# Patient Record
Sex: FEMALE | Race: WHITE | NOT HISPANIC OR LATINO | URBAN - NONMETROPOLITAN AREA
[De-identification: names, ages, dates, MRNs, and addresses within clinical notes are randomized per-mention and may not be internally consistent; named-entity substitution may affect disease eponyms.]

---

## 2018-07-30 PROBLEM — E11.9: Noted: 2018-07-24

## 2018-07-30 PROBLEM — H52.03: Noted: 2018-07-30

## 2018-07-30 PROBLEM — H35.363: Noted: 2018-07-30

## 2018-07-30 PROBLEM — H25.043: Noted: 2018-07-30

## 2019-05-13 ENCOUNTER — IMPORTED ENCOUNTER (OUTPATIENT)
Dept: URBAN - NONMETROPOLITAN AREA CLINIC 1 | Facility: CLINIC | Age: 69
End: 2019-05-13

## 2019-05-13 PROCEDURE — 92134 CPTRZ OPH DX IMG PST SGM RTA: CPT

## 2019-05-13 PROCEDURE — 99214 OFFICE O/P EST MOD 30 MIN: CPT

## 2019-05-13 NOTE — PATIENT DISCUSSION
Cataract(s)-Visually significant.-Cataract(s) causing symptomatic impairment of visual function not correctable with a tolerable change in glasses or contact lenses lighting or non-operative means resulting in specific activity limitations and/or participation restrictions including but not limited to reading viewing television driving or meeting vocational or recreational needs. -Expectation is clearer vision and reduced glare disability after cataract removal.-Refer to Dr Maria Esther Lopez for cataract evaluation. NIDDM s  OU-Stressed the importance of keeping blood sugars under control blood pressure under control and weight normalization and regular visits with PCP. -Explained the possible effects of poorly controlled diabetes and the damage that diabetes can cause to ocular health. -Patient to check HgbA1C.-Pt instructed to contact our office with any vision changes. 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 Presbyopia OU-Discussed diagnosis with patient. Hyperopia OU-Discussed diagnosis with patient.

## 2019-06-05 ENCOUNTER — IMPORTED ENCOUNTER (OUTPATIENT)
Dept: URBAN - NONMETROPOLITAN AREA CLINIC 1 | Facility: CLINIC | Age: 69
End: 2019-06-05

## 2019-06-05 PROBLEM — H35.54: Noted: 2019-06-05

## 2019-06-05 PROBLEM — E11.9: Noted: 2019-06-05

## 2019-06-05 PROBLEM — H25.043: Noted: 2019-06-05

## 2019-06-05 PROCEDURE — 92235 FLUORESCEIN ANGRPH MLTIFRAME: CPT

## 2019-06-05 PROCEDURE — 92250 FUNDUS PHOTOGRAPHY W/I&R: CPT

## 2019-06-05 NOTE — PATIENT DISCUSSION
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 Cataract(s)- Proceed with catract evaluation with Dr. Yesenia MÁRQUEZ-Stressed the importance of keeping blood sugars under control blood pressure under control and weight normalization and regular visits with PCP. -Explained the possible effects of poorly controlled diabetes and the damage that diabetes can cause to ocular health. -Patient to check HgbA1C.-Pt instructed to contact our office with any vision changes.

## 2019-10-21 ENCOUNTER — IMPORTED ENCOUNTER (OUTPATIENT)
Dept: URBAN - NONMETROPOLITAN AREA CLINIC 1 | Facility: CLINIC | Age: 69
End: 2019-10-21

## 2019-10-21 PROBLEM — H35.3131: Noted: 2019-10-21

## 2019-10-21 PROBLEM — H25.813: Noted: 2019-10-21

## 2019-10-21 PROCEDURE — 92014 COMPRE OPH EXAM EST PT 1/>: CPT

## 2019-10-21 NOTE — PATIENT DISCUSSION
Cataract(s)-Visually significant cataract OU .-Cataract(s) causing symptomatic impairment of visual function not correctable with a tolerable change in glasses or contact lenses lighting or non-operative means resulting in specific activity limitations and/or participation restrictions including but not limited to reading viewing television driving or meeting vocational or recreational needs. -Expectation is clearer vision and functional improvement in symptoms as well as reduced glare disability after cataract removal.-Order IOLMaster and OPD today. -Recommend Stand IOL/Trad based on today's OPD testing and lifestyle questionnaire.-All questions were answered regarding surgery including pre and post-op medications appointments activity restrictions and anesthetic usage.-The risks benefits and alternatives and special risk factors for the patient were discussed in detail including but not limited to: bleeding infection retinal detachment vitreous loss problems with the implant and possible need for additional surgery.-Although rare the possibility of complete vision loss was discussed.-The possible need for glasses post-operatively was discussed.-Order medical clearance exam based on history of NIDDM -Patient elects to proceed with cataract surgery OD . Will schedule at patient's convenience and re-evaluate OS  in the future. Discussed all lens options with patient. Discussed the ARMD in detail with patient in detail and d/t this I do not recommend patient getting upgraded lens b/c I can not guarntee perfect vision after surgery. Recommend Standard IOL /Trad and discussed this with her. Discussed with patient she will see better without cataracts but she will then notice the distortion from the ARMD after surgery.  Discussed the Subfoveal Lesion Centrally OU and reviewed the scans that were taken previously with Dr. Pam Naik

## 2019-11-13 ENCOUNTER — IMPORTED ENCOUNTER (OUTPATIENT)
Dept: URBAN - NONMETROPOLITAN AREA CLINIC 1 | Facility: CLINIC | Age: 69
End: 2019-11-13

## 2019-11-13 PROBLEM — H35.3131: Noted: 2019-11-13

## 2019-11-13 PROBLEM — H25.813: Noted: 2019-11-13

## 2019-12-09 ENCOUNTER — IMPORTED ENCOUNTER (OUTPATIENT)
Dept: URBAN - NONMETROPOLITAN AREA CLINIC 1 | Facility: CLINIC | Age: 69
End: 2019-12-09

## 2019-12-09 PROBLEM — I10: Noted: 2019-12-09

## 2019-12-09 PROBLEM — Z01.818: Noted: 2019-12-09

## 2019-12-09 PROBLEM — E78.5: Noted: 2019-12-09

## 2019-12-09 PROBLEM — E11.9: Noted: 2019-12-09

## 2019-12-20 ENCOUNTER — IMPORTED ENCOUNTER (OUTPATIENT)
Dept: URBAN - NONMETROPOLITAN AREA CLINIC 1 | Facility: CLINIC | Age: 69
End: 2019-12-20

## 2019-12-20 PROBLEM — Z98.41: Noted: 2019-12-20

## 2019-12-20 PROBLEM — H25.812: Noted: 2019-12-20

## 2019-12-20 PROCEDURE — 99024 POSTOP FOLLOW-UP VISIT: CPT

## 2019-12-20 PROCEDURE — 92136 OPHTHALMIC BIOMETRY: CPT

## 2019-12-20 PROCEDURE — 66984 XCAPSL CTRC RMVL W/O ECP: CPT

## 2019-12-20 NOTE — PATIENT DISCUSSION
Cataract OS-Visually significant cataract OS. -Cataract causing symptomatic impairment of visual function not correctable with a tolerable change in glasses or contact lenses lighting or non-operative means resulting in specific activity limitations and/or participation restrictions including but not limited to reading viewing television driving or meeting vocational or recreational needs. -Expectation is clearer vision and functional improvement in symptoms as well as reduced glare disability after cataract removal.-Recommend standard/traditional based on previous OPD testing and lifestyle questionnaire.-All questions were answered regarding surgery including pre and post-op medications appointments activity restrictions and anesthetic usage.-The risks benefits and alternatives and special risk factors for the patient were discussed in detail including but not limited to: bleeding infection retinal detachment vitreous loss problems with the implant and possible need for additional surgery.-Although rare the possibility of complete vision loss was discussed.-The need for glasses post-operatively was discussed.-Patient elects to proceed with cataract surgery OS. Will schedule at patient's convenience. Same day s/p PCIOL OD-Pt doing well s/p PCIOL. -Continue post-op gtts according to instruction sheet and sleep with eye shield over eye for 7 nights.-Avoid bending at the waist lifting anything over 5lbs and dirty or marielle environments. -RTC .

## 2019-12-23 ENCOUNTER — IMPORTED ENCOUNTER (OUTPATIENT)
Dept: URBAN - NONMETROPOLITAN AREA CLINIC 1 | Facility: CLINIC | Age: 69
End: 2019-12-23

## 2019-12-23 PROBLEM — H35.54: Noted: 2019-12-23

## 2019-12-23 PROBLEM — E11.9: Noted: 2019-12-23

## 2019-12-23 PROBLEM — Z98.42: Noted: 2020-01-02

## 2019-12-23 PROBLEM — Z98.41: Noted: 2019-12-23

## 2019-12-23 PROBLEM — Z98.41: Noted: 2020-01-02

## 2019-12-23 PROBLEM — H25.812: Noted: 2019-12-23

## 2019-12-23 PROCEDURE — 99024 POSTOP FOLLOW-UP VISIT: CPT

## 2019-12-23 NOTE — PATIENT DISCUSSION
3 day s/p CE c IOL OD-Pt doing well s/p PCIOL. - Due to corneal edema recommend begin Dank 128 2 gtts QID OD.-Continue post-op gtts according to instruction sheet and sleep with eye shield over eye for 4 nights.-Avoid bending at the waist lifting anything over 5lbs and dirty or marielle environments.- Recommend follow-up as scheduled. Cataract OS- Recommend proceed with cataract surgery OS as scheduled. Vitelliform dystrophy OU - Previos FA shows OU Very difficult to view due to medial opacity; central foveal lesions consistent with vitelliform or RPE clumping.- No ARMD no treatment inidcated recommend observation.

## 2019-12-30 ENCOUNTER — IMPORTED ENCOUNTER (OUTPATIENT)
Dept: URBAN - NONMETROPOLITAN AREA CLINIC 1 | Facility: CLINIC | Age: 69
End: 2019-12-30

## 2019-12-30 PROCEDURE — 99024 POSTOP FOLLOW-UP VISIT: CPT

## 2019-12-30 NOTE — PATIENT DISCUSSION
s/p PCIOL CE OD Standard IOL /Trad 12/20/19-Pt doing well at 1 week s/p PCIOL. -Continue post-op gtts according to instruction sheet.-Okay to resume usual activites and d/c eye shield.-Edema resolved  compliant with Dank 128 QID. Cataract OS- Recommend proceed with cataract surgery OS as scheduled. -Recc using Dank 128 QID after surgery as well. Vitelliform dystrophy OU - Previos FA shows OU Very difficult to view due to medial opacity; central foveal lesions consistent with vitelliform or RPE clumping.- No ARMD no treatment inidcated recommend observation. ""

## 2020-01-02 ENCOUNTER — IMPORTED ENCOUNTER (OUTPATIENT)
Dept: URBAN - NONMETROPOLITAN AREA CLINIC 1 | Facility: CLINIC | Age: 70
End: 2020-01-02

## 2020-01-02 PROCEDURE — 92136 OPHTHALMIC BIOMETRY: CPT

## 2020-01-02 PROCEDURE — 99024 POSTOP FOLLOW-UP VISIT: CPT

## 2020-01-02 PROCEDURE — 66984 XCAPSL CTRC RMVL W/O ECP: CPT

## 2020-01-02 NOTE — PATIENT DISCUSSION
Same day s/p PCIOL OS-Pt doing well s/p PCIOL. -Continue post-op gtts according to instruction sheet and sleep with eye shield over eye for 7 nights.-Avoid bending at the waist lifting anything over 5lbs and dirty or marielle environments. 2 week s/p PCIOL OD-Pt doing well at 2 week s/p PCIOL. -Continue post-op gtts according to instruction sheet. Vitelliform dystrophy OU - Previos FA shows OU Very difficult to view due to medial opacity; central foveal lesions consistent with vitelliform or RPE clumping.- No ARMD no treatment inidcated recommend observation.

## 2020-01-09 ENCOUNTER — IMPORTED ENCOUNTER (OUTPATIENT)
Dept: URBAN - NONMETROPOLITAN AREA CLINIC 1 | Facility: CLINIC | Age: 70
End: 2020-01-09

## 2020-01-09 PROCEDURE — 99024 POSTOP FOLLOW-UP VISIT: CPT

## 2020-01-09 NOTE — PATIENT DISCUSSION
1 week s/p PCIOL OS-Pt doing well at 1 week s/p PCIOL. -Continue post-op gtts according to instruction sheet.-Okay to resume usual activites and d/c eye shield. 2 1/2 week s/p PCIOL OD-Pt doing well at 2 week s/p PCIOL. -Continue post-op gtts according to instruction sheet. Vitelliform dystrophy OU - Previos FA shows OU Very difficult to view due to medial opacity; central foveal lesions consistent with vitelliform or RPE clumping.- No ARMD no treatment inidcated recommend observation.

## 2020-02-10 ENCOUNTER — IMPORTED ENCOUNTER (OUTPATIENT)
Dept: URBAN - NONMETROPOLITAN AREA CLINIC 1 | Facility: CLINIC | Age: 70
End: 2020-02-10

## 2020-02-10 PROCEDURE — 99024 POSTOP FOLLOW-UP VISIT: CPT

## 2020-02-10 NOTE — PATIENT DISCUSSION
1 month s/p PCIOL OS-Pt doing well at 1 month s/p PCIOL. 6 week s/p PCIOL OD-Pt doing well at 6 week s/p PCIOL. -Continue post-op gtts according to instruction sheet. Vitelliform dystrophy OU - Previos FA shows OU Very difficult to view due to medial opacity; central foveal lesions consistent with vitelliform or RPE clumping.- No ARMD no treatment inidcated recommend observation.

## 2020-04-21 ENCOUNTER — IMPORTED ENCOUNTER (OUTPATIENT)
Dept: URBAN - NONMETROPOLITAN AREA CLINIC 1 | Facility: CLINIC | Age: 70
End: 2020-04-21

## 2020-04-21 PROCEDURE — 99212 OFFICE O/P EST SF 10 MIN: CPT

## 2020-04-21 NOTE — PATIENT DISCUSSION
conjunctivitis OD- focal injection OD- no purulence no PA nodes- pt has tobrex from past CE/IOL and Pred. acetate use both TID x 7 daysrecommend follow up PRN********************************previous exam notes***************************Vitelliform dystrophy OU - Previos FA shows OU Very difficult to view due to medial opacity; central foveal lesions consistent with vitelliform or RPE clumping.- No ARMD no treatment inidcated recommend observation.

## 2020-04-22 PROBLEM — H35.54: Noted: 2019-12-23

## 2020-04-22 PROBLEM — E11.9: Noted: 2019-12-23

## 2020-04-22 PROBLEM — Z96.1: Noted: 2020-04-22

## 2020-04-22 PROBLEM — H10.31: Noted: 2020-04-22

## 2020-06-18 ENCOUNTER — IMPORTED ENCOUNTER (OUTPATIENT)
Dept: URBAN - NONMETROPOLITAN AREA CLINIC 1 | Facility: CLINIC | Age: 70
End: 2020-06-18

## 2020-06-18 PROBLEM — E11.9: Noted: 2020-06-18

## 2020-06-18 PROBLEM — H35.54: Noted: 2020-06-18

## 2020-06-18 PROBLEM — J01.90: Noted: 2020-06-18

## 2020-06-18 PROBLEM — Z96.1: Noted: 2020-06-18

## 2020-06-18 PROBLEM — H10.31: Noted: 2020-06-18

## 2020-06-18 PROCEDURE — 99212 OFFICE O/P EST SF 10 MIN: CPT

## 2020-06-18 NOTE — PATIENT DISCUSSION
Sinusitis- Pt c/o pain OD when bending down; long Hx of sinus problems; no findings of conjunctivitis. - Recommend begin Augmentin 500/125 mg 1 PO TID.- Recommend [facility] for Diabetic Check ********************************previous exam notes***************************Vitelliform dystrophy OU - Previos FA shows OU Very difficult to view due to medial opacity; central foveal lesions consistent with vitelliform or RPE clumping.- No ARMD no treatment inidcated recommend observation.

## 2020-08-11 ENCOUNTER — IMPORTED ENCOUNTER (OUTPATIENT)
Dept: URBAN - NONMETROPOLITAN AREA CLINIC 1 | Facility: CLINIC | Age: 70
End: 2020-08-11

## 2020-08-11 PROCEDURE — 99213 OFFICE O/P EST LOW 20 MIN: CPT

## 2020-08-11 NOTE — PATIENT DISCUSSION
Sinusitis- Pt c/o pain OD when bending down; long Hx of sinus problems; no findings of conjunctivitis. - Recommend Zithromax 500mg; 1 tab QD x 3 days- Recommend [facility] for Diabetic Check Vitelliform dystrophy OU - OCT ordered today: OD: 255 fovea intact; RPED inferior to WALI; no edema or SRF OS: 250 RPE superior to WALI no edema or SRF- Previous FA shows OU Very difficult to view due to medial opacity; central foveallesions consistent with vitelliform or RPE clumping.- No ARMD no treatment inidcated recommend observation. DM w/o Retinopathy - Stressed importance of maintaining strict blood sugar control. No retinopathy indicated at this time continue observation.  Pseudophakia OU - stable continue observation

## 2020-08-13 PROBLEM — H10.31: Noted: 2020-08-13

## 2020-08-13 PROBLEM — E11.9: Noted: 2020-06-18

## 2020-08-13 PROBLEM — Z96.1: Noted: 2020-08-13

## 2020-08-13 PROBLEM — J01.90: Noted: 2020-08-13

## 2020-08-13 PROBLEM — H35.54: Noted: 2020-08-13

## 2021-05-04 ENCOUNTER — IMPORTED ENCOUNTER (OUTPATIENT)
Dept: URBAN - NONMETROPOLITAN AREA CLINIC 1 | Facility: CLINIC | Age: 71
End: 2021-05-04

## 2021-05-04 PROCEDURE — 99214 OFFICE O/P EST MOD 30 MIN: CPT

## 2021-05-04 PROCEDURE — 92250 FUNDUS PHOTOGRAPHY W/I&R: CPT

## 2022-04-09 ASSESSMENT — VISUAL ACUITY
OD_PAM: 20/25
OD_SC: 20/40
OD_CC: 20/30
OS_SC: 20/40
OS_CC: 20/40
OS_SC: 20/40
OD_SC: 20/60+
OD_CC: 20/40-1
OD_GLARE: 20/80
OS_CC: 20/50
OS_SC: 20/40
OD_CC: 20/30
OD_CC: 20/200
OS_CC: 20/40-1
OS_CC: 20/90
OS_PH: 20/25-2
OS_SC: 20/30-1
OD_CC: 20/150
OS_GLARE: 20/50
OS_CC: 20/40
OS_GLARE: 20/50
OD_SC: 20/50+2
OS_CC: 20/40
OD_PAM: 20/25
OS_CC: 20/40-
OD_CC: 20/30-2
OD_PH: 20/30-2
OS_AM: 20/25
OS_CC: 20/80
OS_CC: 20/40
OD_CC: 20/30-1
OS_PH: 20/40-1
OD_CC: 20/40-1
OS_AM: 20/25
OS_AM: 20/25
OD_PH: 20/30-2
OS_GLARE: 20/50
OD_GLARE: 20/80
OS_CC: 20/40
OD_CC: 20/30-2
OS_CC: 20/30
OD_CC: 20/40
OD_SC: 20/60+

## 2022-04-09 ASSESSMENT — TONOMETRY
OS_IOP_MMHG: 16
OD_IOP_MMHG: 18
OD_IOP_MMHG: 14
OS_IOP_MMHG: 13
OS_IOP_MMHG: 16
OD_IOP_MMHG: 12
OD_IOP_MMHG: 16
OD_IOP_MMHG: 15
OD_IOP_MMHG: 17
OD_IOP_MMHG: 12
OS_IOP_MMHG: 12
OD_IOP_MMHG: 16
OS_IOP_MMHG: 19
OS_IOP_MMHG: 14
OS_IOP_MMHG: 16
OS_IOP_MMHG: 16
OS_IOP_MMHG: 12
OD_IOP_MMHG: 18
OS_IOP_MMHG: 16
OD_IOP_MMHG: 14
OD_IOP_MMHG: 15
OS_IOP_MMHG: 18
OS_IOP_MMHG: 16
OD_IOP_MMHG: 14

## 2022-07-19 NOTE — PATIENT DISCUSSION
Patient states she is in Heart Failure but it has been stable. Patient's Cardiologist is Dr. Dior Ontiveros MD at [address] [place] [address] p# [phone].

## 2022-07-19 NOTE — PATIENT DISCUSSION
Highly recommend [employer] pkg secondary to patient having FemtoSecond laser on OD back in 2016.

## 2022-07-19 NOTE — PATIENT DISCUSSION
Patient to determine if she would like to leave [employer] as Near or reset to Distance. Patient states she has not had any problems with the way vision has been the past two years since Cataract has gotten bigger. Patient strongly leaning towards keeping [employer] for Near so please run scans for -1. 8.